# Patient Record
Sex: FEMALE | Race: WHITE | NOT HISPANIC OR LATINO
[De-identification: names, ages, dates, MRNs, and addresses within clinical notes are randomized per-mention and may not be internally consistent; named-entity substitution may affect disease eponyms.]

---

## 2021-04-17 ENCOUNTER — TRANSCRIPTION ENCOUNTER (OUTPATIENT)
Age: 31
End: 2021-04-17

## 2023-08-15 PROBLEM — Z00.00 ENCOUNTER FOR PREVENTIVE HEALTH EXAMINATION: Status: ACTIVE | Noted: 2023-08-15

## 2023-08-16 ENCOUNTER — NON-APPOINTMENT (OUTPATIENT)
Age: 33
End: 2023-08-16

## 2023-08-18 ENCOUNTER — APPOINTMENT (OUTPATIENT)
Dept: BREAST CENTER | Facility: CLINIC | Age: 33
End: 2023-08-18
Payer: COMMERCIAL

## 2023-08-18 ENCOUNTER — NON-APPOINTMENT (OUTPATIENT)
Age: 33
End: 2023-08-18

## 2023-08-18 VITALS
BODY MASS INDEX: 24.16 KG/M2 | HEIGHT: 65 IN | SYSTOLIC BLOOD PRESSURE: 135 MMHG | DIASTOLIC BLOOD PRESSURE: 84 MMHG | WEIGHT: 145 LBS | HEART RATE: 84 BPM

## 2023-08-18 PROCEDURE — 99204 OFFICE O/P NEW MOD 45 MIN: CPT

## 2023-08-18 NOTE — HISTORY OF PRESENT ILLNESS
[FreeTextEntry1] : This is a 33 year old female referred by Dr. Case for a right breast mass.   The area of palpable concern was evaluated with a right targeted ultrasound (7/26/2023, [facility]) and showed an area of cystic change with a dominant 2 cm cyst at 7:00 N5. This correlated with area of palpable concern. Baseline imaging was ordered and done (8/14/2023, [facility]). Mammogram findings showed extremely dense breast tissue. In the right lower breast smooth walled densities were seen correlating with cystic structures seen on above mentioned right targeted ultrasound. A focal asymmetry was seen in the left upper breast. Bilateral ultrasound was done for density. No suspicious findings were seen on ultrasound correlating to left breast asymmetry. A 6 month follow up diagnostic mammogram has been recommended.  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  She has noticed a change in her right breast or a right breast lump since 1 month. She has not noticed a change in her nipple or nipple area. She has not noticed a change in the skin of the breast. She is experiencing right nipple discharge. She is not experiencing breast pain. She has not noticed a lump or lymph node under the armpit.   BREAST CANCER RISK FACTORS Menarche: 11 Date of LMP: 8/2023  Menopause: pre  Grav:  0    Para:  0 Age at first live birth: n/a  Nursed: N/a Hysterectomy: N Oophorectomy: N OCP: Y HRT: N Last pap/pelvic exam: 2021 WNL  Related family history: Mat Gma Uterine CA Ashkenazi: N Mastery risk assessment: BRCAPRO  14.0% TCv7  16.4% TCv8  16.4% Val N/A Star  N/A BRCA testing: N Bra size: 32B  Last mammogram:  8/14/2023   Baseline                         Location: [facility] Report reviewed.                                 Images reviewed. Results: Birads 0 Extremely dense breast tissue. Right lower breast smooth walled densities correlating to cystic changes on ultrasound. Left upper breast focal asymmetry may reflect fibroglandular tissue. Rec targeted ultrasound.  Last ultrasound: 8/14/2023                                  Location: [facility] Report reviewed.                                 Images reviewed.  Results: Birads 3 Multiple bilateral benign cysts. Largest measuring 2 cm in the right breast 7:00 N5 correlating to area of palpable concern.  No suspicious findings in area of asymmetry in the left upper breast. Rec 6m f/u left diag mammo.  Last MRI:     never                                        Location: Report reviewed.

## 2023-08-18 NOTE — CONSULT LETTER
[Dear  ___] : Dear  [unfilled], [( Thank you for referring [unfilled] for consultation for _____ )] : Thank you for referring [unfilled] for consultation for [unfilled] [Please see my note below.] : Please see my note below. [Consult Closing:] : Thank you very much for allowing me to participate in the care of this patient.  If you have any questions, please do not hesitate to contact me. [Sincerely,] : Sincerely, [DrEn  ___] : Dr. WEN [FreeTextEntry3] : [place] [address]

## 2023-08-18 NOTE — PHYSICAL EXAM
[Normocephalic] : normocephalic [Atraumatic] : atraumatic [Supple] : supple [No Supraclavicular Adenopathy] : no supraclavicular adenopathy [Examined in the supine and seated position] : examined in the supine and seated position [Symmetrical] : symmetrical [No dominant masses] : no dominant masses in right breast  [No dominant masses] : no dominant masses left breast [No Nipple Retraction] : no left nipple retraction [No Nipple Discharge] : no left nipple discharge [No Axillary Lymphadenopathy] : no left axillary lymphadenopathy [No Edema] : no edema [No Rashes] : no rashes [No Ulceration] : no ulceration [de-identified] : CHEN's RAJINDERQ [de-identified] : CHEN's SILAS

## 2023-08-18 NOTE — REVIEW OF SYSTEMS
[Earache] : earache [Loss Of Hearing] : hearing loss [Breast Pain] : breast pain [Breast Lump] : breast lump [Breast Warmth] : breast warmth [Breast Itching] : breast itching [Negative] : Heme/Lymph

## 2023-08-18 NOTE — ASSESSMENT
[FreeTextEntry1] : 32 yo female with right breast cyst with pain reviewed her risk status, she is not high risk We reviewed risk reduction strategies including maintaining a BMI <25, limiting red meat intake and alcoholic beverages to 3 per week and exercise (150 min/ week low intensity or 75 min/week high intensity). And maintaining a normal vitamin D level.  reviewed imaging with Dr. Britton the asymmetry persists and is amenable to stereotactic biopsy discussed with patient and she would like biopsy and cyst aspiration of right breast she will call to schedule I will call her with pathology and go from there She knows to call or return sooner should any concerns or questions arise.

## 2024-02-26 ENCOUNTER — APPOINTMENT (OUTPATIENT)
Dept: BREAST CENTER | Facility: CLINIC | Age: 34
End: 2024-02-26
Payer: COMMERCIAL

## 2024-02-26 VITALS
WEIGHT: 145 LBS | SYSTOLIC BLOOD PRESSURE: 136 MMHG | HEIGHT: 65 IN | DIASTOLIC BLOOD PRESSURE: 85 MMHG | HEART RATE: 84 BPM | BODY MASS INDEX: 24.16 KG/M2

## 2024-02-26 DIAGNOSIS — R92.8 OTHER ABNORMAL AND INCONCLUSIVE FINDINGS ON DIAGNOSTIC IMAGING OF BREAST: ICD-10-CM

## 2024-02-26 DIAGNOSIS — Z87.891 PERSONAL HISTORY OF NICOTINE DEPENDENCE: ICD-10-CM

## 2024-02-26 DIAGNOSIS — L40.9 PSORIASIS, UNSPECIFIED: ICD-10-CM

## 2024-02-26 DIAGNOSIS — N60.01 SOLITARY CYST OF RIGHT BREAST: ICD-10-CM

## 2024-02-26 DIAGNOSIS — Z78.9 OTHER SPECIFIED HEALTH STATUS: ICD-10-CM

## 2024-02-26 DIAGNOSIS — Z80.8 FAMILY HISTORY OF MALIGNANT NEOPLASM OF OTHER ORGANS OR SYSTEMS: ICD-10-CM

## 2024-02-26 DIAGNOSIS — Z80.49 FAMILY HISTORY OF MALIGNANT NEOPLASM OF OTHER GENITAL ORGANS: ICD-10-CM

## 2024-02-26 DIAGNOSIS — Z80.1 FAMILY HISTORY OF MALIGNANT NEOPLASM OF TRACHEA, BRONCHUS AND LUNG: ICD-10-CM

## 2024-02-26 DIAGNOSIS — N63.20 UNSPECIFIED LUMP IN THE LEFT BREAST, UNSPECIFIED QUADRANT: ICD-10-CM

## 2024-02-26 PROCEDURE — 99215 OFFICE O/P EST HI 40 MIN: CPT

## 2024-02-26 RX ORDER — SEGESTERONE ACETATE AND ETHINYL ESTRADIOL 103; 17.4 MG/1; MG/1
0.013-0.15 RING VAGINAL
Refills: 0 | Status: ACTIVE | COMMUNITY

## 2024-02-26 RX ORDER — MULTIVITAMIN
TABLET ORAL
Refills: 0 | Status: ACTIVE | COMMUNITY

## 2024-02-26 NOTE — CONSULT LETTER
[Dear  ___] : Dear  [unfilled], [( Thank you for referring [unfilled] for consultation for _____ )] : Thank you for referring [unfilled] for consultation for [unfilled] [Please see my note below.] : Please see my note below. [Sincerely,] : Sincerely, [Consult Closing:] : Thank you very much for allowing me to participate in the care of this patient.  If you have any questions, please do not hesitate to contact me. [DrEn  ___] : Dr. WEN [Courtesy Letter:] : I had the pleasure of seeing your patient, [unfilled], in my office today. [FreeTextEntry3] : Cary [place] [address]

## 2024-02-26 NOTE — PHYSICAL EXAM
[Normocephalic] : normocephalic [Atraumatic] : atraumatic [Supple] : supple [Examined in the supine and seated position] : examined in the supine and seated position [No Supraclavicular Adenopathy] : no supraclavicular adenopathy [Symmetrical] : symmetrical [No dominant masses] : no dominant masses left breast [No dominant masses] : no dominant masses in right breast  [No Nipple Retraction] : no left nipple retraction [No Nipple Discharge] : no left nipple discharge [No Axillary Lymphadenopathy] : no left axillary lymphadenopathy [No Edema] : no edema [No Rashes] : no rashes [No Ulceration] : no ulceration [de-identified] : CHEN's RAJINDERQ [de-identified] : FGC's UOQ, poss cyst 2:00 N1

## 2024-02-26 NOTE — HISTORY OF PRESENT ILLNESS
[FreeTextEntry1] : This is a 33 year old female referred by Dr. Case for a right breast mass.   The area of palpable concern was evaluated with a right targeted ultrasound (7/26/2023, [facility]) and showed an area of cystic change with a dominant 2 cm cyst at 7:00 N5. This correlated with area of palpable concern. Baseline imaging was ordered and done (8/14/2023, [facility]). Mammogram findings showed extremely dense breast tissue. In the right lower breast smooth walled densities were seen correlating with cystic structures seen on above mentioned right targeted ultrasound. A focal asymmetry was seen in the left upper breast. Bilateral ultrasound was done for density. No suspicious findings were seen on ultrasound correlating to left breast asymmetry.  She is s/p left breast 12:00 stereo bx pathology showed benign breast tissue with fibroadenomatoid hyperplasia and mature adipose tissue. 6month follow up imaing was negative. 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  She has noticed a change in her right breast or a right breast lump since 1 month. She has not noticed a change in her nipple or nipple area. She has not noticed a change in the skin of the breast. She is experiencing right nipple discharge. She is not experiencing breast pain. She has not noticed a lump or lymph node under the armpit.   BREAST CANCER RISK FACTORS Menarche: 11 Date of LMP: 02/12/2024 Menopause: pre  Grav:  0    Para:  0 Age at first live birth: n/a  Nursed: N/a Hysterectomy: N Oophorectomy: N OCP: Y HRT: N Last pap/pelvic exam: 2021 WNL  Related family history: Mat Gma Uterine CA Ashkenazi: N Mastery risk assessment: BRCAPRO  14.0% TCv7  16.4% TCv8  16.4% Val N/A Star  N/A BRCA testing: N Bra size: 32B  Last mammogram:  02/04/2024  left                         Location: [facility] Report reviewed.                                 Images reviewed. Results: Birads 2 Extremely dense breast tissue. Stable left breast mammohram on this short-term f/u  Last ultrasound: 09/01/2023   left                              Location: [facility] Report reviewed.                                                   Images reviewed.  Results: Birads 2 Cystic alteration. No suspicious findings on this targeted u/s. Left breast sahil synthesis biopsy was performed after this u/s.   Last MRI:     never                                        Location: Report reviewed.

## 2024-02-26 NOTE — ASSESSMENT
[FreeTextEntry1] : 32 yo female with right breast cyst with pain reviewed her risk status, she is not high risk We reviewed risk reduction strategies including maintaining a BMI <25, limiting red meat intake and alcoholic beverages to 3 per week and exercise (150 min/ week low intensity or 75 min/week high intensity). And maintaining a normal vitamin D level. reviewed common etiologies of breast pain including caffeine, hormones and stress; left breast discomfort likely from cyst plan bilateral sonogram now if negative plan cbe 6 months to ensure stability She knows to call or return sooner should any concerns or questions arise.

## 2024-04-15 ENCOUNTER — NON-APPOINTMENT (OUTPATIENT)
Age: 34
End: 2024-04-15

## 2024-07-16 DIAGNOSIS — N63.10 UNSPECIFIED LUMP IN THE RIGHT BREAST, UNSPECIFIED QUADRANT: ICD-10-CM

## 2024-07-18 ENCOUNTER — NON-APPOINTMENT (OUTPATIENT)
Age: 34
End: 2024-07-18

## 2024-11-01 ENCOUNTER — APPOINTMENT (OUTPATIENT)
Dept: BREAST CENTER | Facility: CLINIC | Age: 34
End: 2024-11-01

## 2025-01-06 ENCOUNTER — NON-APPOINTMENT (OUTPATIENT)
Age: 35
End: 2025-01-06